# Patient Record
Sex: FEMALE | Race: WHITE | Employment: UNEMPLOYED | URBAN - METROPOLITAN AREA
[De-identification: names, ages, dates, MRNs, and addresses within clinical notes are randomized per-mention and may not be internally consistent; named-entity substitution may affect disease eponyms.]

---

## 2024-03-28 ENCOUNTER — OUTSIDE SERVICES (OUTPATIENT)
Dept: PRIMARY CARE CLINIC | Age: 79
End: 2024-03-28
Payer: COMMERCIAL

## 2024-03-28 DIAGNOSIS — D64.9 ANEMIA, UNSPECIFIED TYPE: Primary | ICD-10-CM

## 2024-03-28 DIAGNOSIS — N39.0 URINARY TRACT INFECTION WITHOUT HEMATURIA, SITE UNSPECIFIED: ICD-10-CM

## 2024-03-28 DIAGNOSIS — M12.9 ARTHROPATHY: ICD-10-CM

## 2024-03-28 DIAGNOSIS — K22.70 BARRETT'S ESOPHAGUS WITHOUT DYSPLASIA: ICD-10-CM

## 2024-03-28 DIAGNOSIS — K21.9 GERD WITHOUT ESOPHAGITIS: ICD-10-CM

## 2024-03-28 DIAGNOSIS — K92.2 GASTROINTESTINAL HEMORRHAGE, UNSPECIFIED GASTROINTESTINAL HEMORRHAGE TYPE: ICD-10-CM

## 2024-03-28 DIAGNOSIS — E03.9 HYPOTHYROIDISM, UNSPECIFIED TYPE: ICD-10-CM

## 2024-03-28 PROCEDURE — 99309 SBSQ NF CARE MODERATE MDM 30: CPT | Performed by: NURSE PRACTITIONER

## 2024-03-28 ASSESSMENT — ENCOUNTER SYMPTOMS
FACIAL SWELLING: 0
EYE PAIN: 0
WHEEZING: 0
EYE REDNESS: 0
DIARRHEA: 0
CHOKING: 0
CHEST TIGHTNESS: 0
VOMITING: 0
PHOTOPHOBIA: 0
EYE DISCHARGE: 0
TROUBLE SWALLOWING: 0
SHORTNESS OF BREATH: 1
EYE ITCHING: 0
VOICE CHANGE: 0
ABDOMINAL DISTENTION: 0
RHINORRHEA: 0
BACK PAIN: 1
NAUSEA: 0
SORE THROAT: 0
SINUS PAIN: 0
COUGH: 0
COLOR CHANGE: 0
SINUS PRESSURE: 0
ABDOMINAL PAIN: 0
CONSTIPATION: 0
BLOOD IN STOOL: 0

## 2024-03-28 ASSESSMENT — VISUAL ACUITY: OU: 1

## 2024-03-28 NOTE — PROGRESS NOTES
3/28/24  Molly Davis : 1945 Sex: female  Age: 79 y.o.      Molly is seen today for a skilled visit and a review of her overall condition and plan of care.  She reports that she went to the hospital with complaints of weakness and was subsequently diagnosed with anemia and a UTI.  She reports that she had had some confusion, but states that has since cleared.  She states that she lives at home by herself and has not had a primary care provider in the past 3 to 4 years.  She states that her previous provider kept prescribing a medication that she was allergic to.  She states that this is the reason she stopped going to see them.  She is currently on oxygen at 2 L/min, which she states is new from being in the hospital.  This is not something that she had been on at home.  She denies any further acute complaints and staff reports they are not aware of any acute issues with her.  They deny any combative, disruptive or aggressive behaviors from her.        Review of Systems   Constitutional:  Positive for activity change and fatigue. Negative for appetite change, chills, diaphoresis, fever and unexpected weight change.   HENT:  Negative for congestion, ear pain, facial swelling, hearing loss, nosebleeds, postnasal drip, rhinorrhea, sinus pressure, sinus pain, sneezing, sore throat, tinnitus, trouble swallowing and voice change.    Eyes:  Negative for photophobia, pain, discharge, redness and itching.   Respiratory:  Positive for shortness of breath (With exertion). Negative for cough, choking, chest tightness and wheezing.    Cardiovascular:  Negative for chest pain, palpitations and leg swelling.   Gastrointestinal:  Negative for abdominal distention, abdominal pain, blood in stool, constipation, diarrhea, nausea and vomiting.   Endocrine: Negative for cold intolerance, heat intolerance, polydipsia, polyphagia and polyuria.   Genitourinary:  Negative for difficulty urinating, dysuria, flank pain,

## 2024-04-11 ENCOUNTER — OUTSIDE SERVICES (OUTPATIENT)
Dept: PRIMARY CARE CLINIC | Age: 79
End: 2024-04-11

## 2024-04-11 DIAGNOSIS — E03.9 HYPOTHYROIDISM, UNSPECIFIED TYPE: ICD-10-CM

## 2024-04-11 DIAGNOSIS — M12.9 ARTHROPATHY: ICD-10-CM

## 2024-04-11 DIAGNOSIS — K22.70 BARRETT'S ESOPHAGUS WITHOUT DYSPLASIA: ICD-10-CM

## 2024-04-11 DIAGNOSIS — K21.9 GERD WITHOUT ESOPHAGITIS: ICD-10-CM

## 2024-04-11 DIAGNOSIS — D64.9 ANEMIA, UNSPECIFIED TYPE: Primary | ICD-10-CM

## 2024-04-11 NOTE — PROGRESS NOTES
24  Molly Davis : 1945 Sex: female  Age: 79 y.o.      Molly is seen today for a possible discharge to home.  She is currently awaiting the decision of an appeal, from her insurance company.  She reports that she is having no further issues.  She still reports her shortness of breath with exertion and fatigue.  She is still having problems with ambulation, due to bilateral knee pain.  She reports the pain is not as intense, when she is sitting still, as it is when she is walking.  Staff reports that they are not aware of any further issues with her.  They deny any combative, disruptive or aggressive behaviors from her.        Review of Systems  Constitutional:  Positive for activity change and fatigue. Negative for appetite change, chills, diaphoresis, fever and unexpected weight change.   HENT:  Negative for congestion, ear pain, facial swelling, hearing loss, nosebleeds, postnasal drip, rhinorrhea, sinus pressure, sinus pain, sneezing, sore throat, tinnitus, trouble swallowing and voice change.    Eyes:  Negative for photophobia, pain, discharge, redness and itching.   Respiratory:  Positive for shortness of breath (With exertion). Negative for cough, choking, chest tightness and wheezing.    Cardiovascular:  Negative for chest pain, palpitations and leg swelling.   Gastrointestinal:  Negative for abdominal distention, abdominal pain, blood in stool, constipation, diarrhea, nausea and vomiting.   Endocrine: Negative for cold intolerance, heat intolerance, polydipsia, polyphagia and polyuria.   Genitourinary:  Negative for difficulty urinating, dysuria, flank pain, frequency, hematuria and urgency.   Musculoskeletal:  Positive for arthralgias (Bilateral knees), back pain and gait problem (Due to bilateral knee pain and weakness). Negative for joint swelling, myalgias, neck pain and neck stiffness.   Skin:  Negative for color change, rash and wound.   Allergic/Immunologic: Negative for

## 2024-05-02 ENCOUNTER — OUTSIDE SERVICES (OUTPATIENT)
Dept: PRIMARY CARE CLINIC | Age: 79
End: 2024-05-02
Payer: COMMERCIAL

## 2024-05-02 DIAGNOSIS — E03.9 HYPOTHYROIDISM, UNSPECIFIED TYPE: Primary | ICD-10-CM

## 2024-05-02 DIAGNOSIS — K21.9 GERD WITHOUT ESOPHAGITIS: ICD-10-CM

## 2024-05-02 DIAGNOSIS — D64.9 ANEMIA, UNSPECIFIED TYPE: ICD-10-CM

## 2024-05-02 DIAGNOSIS — K22.70 BARRETT'S ESOPHAGUS WITHOUT DYSPLASIA: ICD-10-CM

## 2024-05-02 DIAGNOSIS — M12.9 ARTHROPATHY: ICD-10-CM

## 2024-05-02 PROCEDURE — 99309 SBSQ NF CARE MODERATE MDM 30: CPT | Performed by: NURSE PRACTITIONER

## 2024-05-02 NOTE — PROGRESS NOTES
24  Molly Davis : 1945 Sex: female  Age: 79 y.o.      Molly is seen today for a skilled visit and a review of her overall condition and plan of care.  She reports that she is doing fairly well today and that she is having no acute complaints.  She reports that her breathing has improved and she has only been using the oxygen as needed.  Staff reports that they are not aware of any acute issues with her.  They deny any combative, disruptive or aggressive behaviors from her.        Review of Systems  Constitutional:  Positive for activity change and fatigue. Negative for appetite change, chills, diaphoresis, fever and unexpected weight change.   HENT:  Negative for congestion, ear pain, facial swelling, hearing loss, nosebleeds, postnasal drip, rhinorrhea, sinus pressure, sinus pain, sneezing, sore throat, tinnitus, trouble swallowing and voice change.    Eyes:  Negative for photophobia, pain, discharge, redness and itching.   Respiratory:  Positive for shortness of breath (With exertion.  She states that she has been wearing her oxygen as needed). Negative for cough, choking, chest tightness and wheezing.    Cardiovascular:  Negative for chest pain, palpitations and leg swelling.   Gastrointestinal:  Negative for abdominal distention, abdominal pain, blood in stool, constipation, diarrhea, nausea and vomiting.   Endocrine: Negative for cold intolerance, heat intolerance, polydipsia, polyphagia and polyuria.   Genitourinary:  Negative for difficulty urinating, dysuria, flank pain, frequency, hematuria and urgency.   Musculoskeletal:  Positive for arthralgias (Bilateral knees), back pain and gait problem (Due to bilateral knee pain and weakness). Negative for joint swelling, myalgias, neck pain and neck stiffness.   Skin:  Negative for color change, rash and wound.   Allergic/Immunologic: Negative for environmental allergies and food allergies.   Neurological:  Positive for weakness. Negative for

## 2024-05-08 ENCOUNTER — OUTSIDE SERVICES (OUTPATIENT)
Dept: FAMILY MEDICINE CLINIC | Age: 79
End: 2024-05-08

## 2024-05-08 DIAGNOSIS — M25.531 RIGHT WRIST PAIN: Primary | ICD-10-CM

## 2024-05-08 DIAGNOSIS — N39.0 URINARY TRACT INFECTION WITHOUT HEMATURIA, SITE UNSPECIFIED: ICD-10-CM

## 2024-05-08 DIAGNOSIS — K21.9 GASTROESOPHAGEAL REFLUX DISEASE WITHOUT ESOPHAGITIS: ICD-10-CM

## 2024-05-08 DIAGNOSIS — E03.9 HYPOTHYROIDISM, UNSPECIFIED TYPE: ICD-10-CM

## 2024-05-08 DIAGNOSIS — D64.9 ANEMIA, UNSPECIFIED TYPE: ICD-10-CM

## 2024-05-09 NOTE — PROGRESS NOTES
5/10/2024    Molly Davis  1945    This resident is being seen today for a skilled evaluation visit.  She is a resident who has long-term medical conditions including urinary tract infection, hypothyroidism, gastrointestinal hemorrhage, arthropathy, GERD without esophagitis, Schwab's esophagus, anemia.  She is a 79 y.o. female resident who is being seen today for skilled services with this resident benefiting from physical therapy as well as Occupational Therapy.  She initially had complaints of weakness and had gone to the hospital setting and was diagnosed with anemia as well as an underlying urinary tract infection and has had essential resolution of these issues.  She does state at this time that she has had a history of vertigo which appears to be controlled she does have some right wrist discomfort at this time.  She does maintain that is secondary to having her blood taken and that they \"hit a nerve.\"  She does have a central full range of motion and her hand grasps are fairly equal at this time.  She furthermore has no headaches or dizziness, sore throat, chest pain, nausea or vomiting, constipation or diarrhea, fever or chills, falls or syncopal events.            Medications:  [place] Thyroid 60 mg daily  Colace 100 mg daily  Iron 325 mg daily  Protonix 40 mg daily  Tylenol as needed  Vitamin C 500 mg daily            Objective     Vital Signs: /75 pulse 78 respirations 17 temperature 97.9 O2 96% weight 143.6        Physical examination:Skin is essentially warm and dry. HEENT unremarkable. Neck is supple. Heart regular rate and rhythm. No rubs, gallops or murmurs noted.  Lungs are clear to auscultation.  No evidence of rhonchi, rales, or wheezing. Abdomen is soft, supple and non-tender.  Bowel sounds are noted x4 quadrants. No rigidity, guarding or rebound tenderness.  Negative Schofield's, negative McBurney's, negative Henry's.  Extremities; no true pitting edema.  Pulses are adequate. No

## 2024-05-15 ENCOUNTER — OUTSIDE SERVICES (OUTPATIENT)
Dept: FAMILY MEDICINE CLINIC | Age: 79
End: 2024-05-15

## 2024-05-15 DIAGNOSIS — K21.9 GASTROESOPHAGEAL REFLUX DISEASE WITHOUT ESOPHAGITIS: ICD-10-CM

## 2024-05-15 DIAGNOSIS — K22.719 BARRETT'S ESOPHAGUS WITH DYSPLASIA: ICD-10-CM

## 2024-05-15 DIAGNOSIS — M19.90 ARTHRITIS PAIN: ICD-10-CM

## 2024-05-15 DIAGNOSIS — J30.9 ALLERGIC RHINITIS, UNSPECIFIED SEASONALITY, UNSPECIFIED TRIGGER: Primary | ICD-10-CM

## 2024-05-15 DIAGNOSIS — E03.9 HYPOTHYROIDISM, UNSPECIFIED TYPE: ICD-10-CM

## 2024-05-15 NOTE — PROGRESS NOTES
5/15/2024    Molly Davis  1945    This resident is being seen today for a skilled evaluation visit.  She is a resident who has long-term medical conditions including urinary tract infection, hypothyroidism, gastrointestinal hemorrhage, arthropathy, GERD without esophagitis, Schwab's esophagus, anemia.  She is a 79 y.o. female resident who is being seen today for a skilled evaluation with this resident indicating that she has a lot of arthritic pain throughout.  She furthermore states that she has reflux at bedtime and that she has had some degree of a slight cough with increased postnasal drip.  She has no chest pain, nausea or vomiting, constipation or diarrhea, fever or chills, falls or syncopal events.        Medications:  [place] Thyroid 60 mg daily  Colace 100 mg daily  Iron 325 mg daily  Protonix 40 mg daily  Tylenol as needed  Vitamin C 500 mg daily  Tums 2 tablets daily with supper  Tylenol 650 mg every morning          Objective     Vital Signs: /68 pulse 76 respirations 17 temperature 97.9 O2 97% weight 143.6        Physical examination:Skin is essentially warm and dry. HEENT unremarkable. Neck is supple. Heart regular rate and rhythm. No rubs, gallops or murmurs noted.  Lungs are clear to auscultation.  No evidence of rhonchi, rales, or wheezing. Abdomen is soft, supple and non-tender.  Bowel sounds are noted x4 quadrants. No rigidity, guarding or rebound tenderness.  Negative Schofield's, negative McBurney's, negative Henry's.  Extremities; no true pitting edema.  Pulses are adequate. No clubbing  or no cyanosis noted.  Neurologically she  is alert and oriented x3.  No evidence of paralysis or paresthesias noted.    Diagnoses and all orders for this visit:    Allergic rhinitis, unspecified seasonality, unspecified trigger  Comments:  Initiate Zyrtec 10 mg at bedtime for 2 weeks    Arthritis pain  Comments:  Initiate Tylenol 650 mg every morning    Gastroesophageal reflux disease without

## 2024-05-23 ENCOUNTER — OUTSIDE SERVICES (OUTPATIENT)
Dept: FAMILY MEDICINE CLINIC | Age: 79
End: 2024-05-23

## 2024-05-23 DIAGNOSIS — D64.9 ANEMIA, UNSPECIFIED TYPE: ICD-10-CM

## 2024-05-23 DIAGNOSIS — K59.00 CONSTIPATION, UNSPECIFIED CONSTIPATION TYPE: ICD-10-CM

## 2024-05-23 DIAGNOSIS — M19.90 ARTHRITIS PAIN: Primary | ICD-10-CM

## 2024-05-23 DIAGNOSIS — K22.719 BARRETT'S ESOPHAGUS WITH DYSPLASIA: ICD-10-CM

## 2024-05-23 DIAGNOSIS — K21.9 GASTROESOPHAGEAL REFLUX DISEASE WITHOUT ESOPHAGITIS: ICD-10-CM

## 2024-05-23 NOTE — PROGRESS NOTES
type  Comments:  Controlled with iron supplementation and observation of CBC    Schwab's esophagus with dysplasia  Comments:  Scheduled to be seen in conjunction with gastroenterology in July    Constipation, unspecified constipation type  Comments:  Controlled with Colace    Gastroesophageal reflux disease without esophagitis  Comments:  Controlled with Protonix              Plan:  Plan of care was discussed with the healthcare team with medications and labs reviewed.  Given the fact that she does have a lot of arthritic pain and she feels that the Tylenol is effective I am going to change her Tylenol to 500 mg 3 times a day routinely.  She will be monitored for any further complaints with respect to her pain.  She will continue with the benefit of her therapy as tolerated.  She will follow-up with department of gastroenterology in July as previously recommended as staff will continue to crush all of her medications to help with medication administration and she will continue with the mechanical soft diet.  I will continue to track her intakes, monitor her weights and behaviors, and see her routinely and as needed with further orders forthcoming.      ANGELA GUZMAN, APRN - CNP      *Note was creating using voice recognition software.  The document was reviewed however grammatical errors may exist.

## 2024-05-30 ENCOUNTER — OUTSIDE SERVICES (OUTPATIENT)
Dept: FAMILY MEDICINE CLINIC | Age: 79
End: 2024-05-30

## 2024-05-30 DIAGNOSIS — M19.90 ARTHRITIS PAIN: ICD-10-CM

## 2024-05-30 DIAGNOSIS — E03.9 HYPOTHYROIDISM, UNSPECIFIED TYPE: ICD-10-CM

## 2024-05-30 DIAGNOSIS — K59.00 CONSTIPATION, UNSPECIFIED CONSTIPATION TYPE: ICD-10-CM

## 2024-05-30 DIAGNOSIS — G62.9 NEUROPATHY: Primary | ICD-10-CM

## 2024-05-30 DIAGNOSIS — K21.9 GASTROESOPHAGEAL REFLUX DISEASE WITHOUT ESOPHAGITIS: ICD-10-CM

## 2024-05-30 NOTE — PROGRESS NOTES
5/30/2024    Molly Davis  1945    This resident is being seen today for a skilled evaluation visit.  She is a resident who has long-term medical conditions including urinary tract infection, hypothyroidism, gastrointestinal hemorrhage, arthropathy, GERD without esophagitis, Schwab's esophagus, anemia.  She is a 79 y.o. female resident who is being seen today for a skilled evaluation with this resident indicating that she does have some ongoing concerns with arthritis as well as neuropathy like feeling especially to her hands.  She has no complaints with regards to any headaches or dizziness, sore throat, chest pain, nausea or vomiting, constipation or diarrhea, fever or chills, syncopal events or seizure activity.        Medications:  [place] Thyroid 60 mg daily  Colace 100 mg daily  Iron 325 mg daily  Protonix 40 mg daily  Tylenol as needed  Vitamin C 500 mg daily  Tums 2 tablets daily with supper  Tylenol 500 mg 3 times daily  Neurontin 100 mg at bedtime        Objective     Vital Signs: /70 pulse 74 respirations 18 temperature 98 O2 97% weight 143.6        Physical examination:Skin is essentially warm and dry. HEENT unremarkable. Neck is supple. Heart regular rate and rhythm. No rubs, gallops or murmurs noted.  Lungs are clear to auscultation.  No evidence of rhonchi, rales, or wheezing. Abdomen is soft, supple and non-tender.  Bowel sounds are noted x4 quadrants. No rigidity, guarding or rebound tenderness.  Negative Schofield's, negative McBurney's, negative Henry's.  Extremities; no true pitting edema.  Pulses are adequate. No clubbing  or no cyanosis noted.  Neurologically she  is alert and oriented x3.  No evidence of paralysis or paresthesias noted.    Diagnoses and all orders for this visit:    Neuropathy  Comments:  Initiate Neurontin 100 mg at bedtime    Hypothyroidism, unspecified type  Comments:  Controlled with [place] Thyroid and close observation of TSH    Arthritis

## 2024-08-07 ENCOUNTER — OUTSIDE SERVICES (OUTPATIENT)
Dept: FAMILY MEDICINE CLINIC | Age: 79
End: 2024-08-07

## 2024-08-07 DIAGNOSIS — D64.9 ANEMIA, UNSPECIFIED TYPE: ICD-10-CM

## 2024-08-07 DIAGNOSIS — R23.8 DRY SCALP: ICD-10-CM

## 2024-08-07 DIAGNOSIS — K22.719 BARRETT'S ESOPHAGUS WITH DYSPLASIA: ICD-10-CM

## 2024-08-07 DIAGNOSIS — E03.9 HYPOTHYROIDISM, UNSPECIFIED TYPE: Primary | ICD-10-CM

## 2024-08-07 DIAGNOSIS — K59.00 CONSTIPATION, UNSPECIFIED CONSTIPATION TYPE: ICD-10-CM

## 2024-08-07 NOTE — PROGRESS NOTES
pitting edema.  Pulses are adequate. No clubbing  or no cyanosis noted.  Neurologically she  is alert and oriented x3.  No evidence of paralysis or paresthesias noted.    Diagnoses and all orders for this visit:    Hypothyroidism, unspecified type    Dry scalp  Comments:  Utilize Selsun Blue daily for 5 days and then on shower days    Schwab's esophagus with dysplasia  Comments:  Maintain the benefit of Protonix    Anemia, unspecified type  Comments:  Currently controlled with iron supplementation    Constipation, unspecified constipation type  Comments:  Controlled with Colace                  Plan:  Plan of care was discussed with the healthcare team with medications and labs reviewed.  She did have some recent lab testing in June with thyroid testing completed with a TSH of 31.279.  I do not see where changes were made with respect to her thyroid supplementation at that time so I am going to upward titrate her [place] Thyroid 180 mg daily and repeat her TSH in 6 weeks.  Regarding her dry scalp I am going to recommend Selsun Blue to the scalp daily for 5 days and then on shower days thereafter.  She will continue with her current medical management with close observation regarding her blood work with the inclusion of her TSH and I will see her routinely and as needed with further orders forthcoming.    ANGELA GUZMAN, APRN - CNP      *Note was creating using voice recognition software.  The document was reviewed however grammatical errors may exist.

## 2024-09-16 ENCOUNTER — OUTSIDE SERVICES (OUTPATIENT)
Dept: FAMILY MEDICINE CLINIC | Age: 79
End: 2024-09-16
Payer: MEDICARE

## 2024-09-16 DIAGNOSIS — K21.9 GASTROESOPHAGEAL REFLUX DISEASE WITHOUT ESOPHAGITIS: ICD-10-CM

## 2024-09-16 DIAGNOSIS — K59.00 CONSTIPATION, UNSPECIFIED CONSTIPATION TYPE: ICD-10-CM

## 2024-09-16 DIAGNOSIS — M19.041 PRIMARY OSTEOARTHRITIS OF BOTH HANDS: Primary | ICD-10-CM

## 2024-09-16 DIAGNOSIS — M19.042 PRIMARY OSTEOARTHRITIS OF BOTH HANDS: Primary | ICD-10-CM

## 2024-09-16 DIAGNOSIS — D64.9 ANEMIA, UNSPECIFIED TYPE: ICD-10-CM

## 2024-09-16 DIAGNOSIS — E03.9 HYPOTHYROIDISM, UNSPECIFIED TYPE: ICD-10-CM

## 2024-09-16 PROCEDURE — 99309 SBSQ NF CARE MODERATE MDM 30: CPT | Performed by: NURSE PRACTITIONER
